# Patient Record
Sex: FEMALE | Race: WHITE | NOT HISPANIC OR LATINO | URBAN - METROPOLITAN AREA
[De-identification: names, ages, dates, MRNs, and addresses within clinical notes are randomized per-mention and may not be internally consistent; named-entity substitution may affect disease eponyms.]

---

## 2018-03-01 ENCOUNTER — APPOINTMENT (RX ONLY)
Dept: URBAN - METROPOLITAN AREA CLINIC 137 | Facility: CLINIC | Age: 43
Setting detail: DERMATOLOGY
End: 2018-03-01

## 2018-03-01 DIAGNOSIS — Z41.9 ENCOUNTER FOR PROCEDURE FOR PURPOSES OTHER THAN REMEDYING HEALTH STATE, UNSPECIFIED: ICD-10-CM

## 2018-03-01 PROCEDURE — ? KYBELLA INJECTION

## 2018-03-01 NOTE — PROCEDURE: KYBELLA INJECTION
Number Of Grid Dots (Won't Render If 0): 24
Packages Used (Won't Render If 0 - Required If Using Inventory): 2
Detail Level: Detailed
Consent: Written consent obtained. Risks include but not limited to bruising, beading, irregular texture, ulceration, infection, allergic reaction, scar formation, incomplete augmentation, temporary nature, procedural pain.
Treatment Number (Won't Render If 0): 0
Expiration Date (Month Year): 04/2018
Anesthesia Volume In Cc: 0.5
Treatment Area: Submental Chin
Lot #: [id number]
Procedure Text: The treatment areas were cleansed. Kybella was administered using the parameters mentioned above.
Post-Care Instructions: Patient instructed to apply ice to reduce swelling.
Price (Use Numbers Only, No Special Characters Or $): 1200.00